# Patient Record
Sex: MALE | Race: WHITE | NOT HISPANIC OR LATINO | Employment: FULL TIME | URBAN - NONMETROPOLITAN AREA
[De-identification: names, ages, dates, MRNs, and addresses within clinical notes are randomized per-mention and may not be internally consistent; named-entity substitution may affect disease eponyms.]

---

## 2020-08-22 ENCOUNTER — HOSPITAL ENCOUNTER (EMERGENCY)
Facility: HOSPITAL | Age: 30
End: 2020-08-23
Attending: EMERGENCY MEDICINE | Admitting: EMERGENCY MEDICINE
Payer: OTHER MISCELLANEOUS

## 2020-08-22 ENCOUNTER — APPOINTMENT (EMERGENCY)
Dept: CT IMAGING | Facility: HOSPITAL | Age: 30
End: 2020-08-22
Payer: OTHER MISCELLANEOUS

## 2020-08-22 DIAGNOSIS — M54.9 BACK PAIN: Primary | ICD-10-CM

## 2020-08-22 LAB
ALBUMIN SERPL BCP-MCNC: 3.8 G/DL (ref 3.5–5)
ALP SERPL-CCNC: 63 U/L (ref 46–116)
ALT SERPL W P-5'-P-CCNC: 39 U/L (ref 12–78)
ANION GAP SERPL CALCULATED.3IONS-SCNC: 9 MMOL/L (ref 4–13)
AST SERPL W P-5'-P-CCNC: 24 U/L (ref 5–45)
BASOPHILS # BLD AUTO: 0.02 THOUSANDS/ΜL (ref 0–0.1)
BASOPHILS NFR BLD AUTO: 0 % (ref 0–1)
BILIRUB SERPL-MCNC: 0.33 MG/DL (ref 0.2–1)
BILIRUB UR QL STRIP: NEGATIVE
BUN SERPL-MCNC: 14 MG/DL (ref 5–25)
CALCIUM SERPL-MCNC: 8.3 MG/DL (ref 8.3–10.1)
CHLORIDE SERPL-SCNC: 107 MMOL/L (ref 100–108)
CLARITY UR: CLEAR
CO2 SERPL-SCNC: 25 MMOL/L (ref 21–32)
COLOR UR: YELLOW
CREAT SERPL-MCNC: 0.98 MG/DL (ref 0.6–1.3)
EOSINOPHIL # BLD AUTO: 0.16 THOUSAND/ΜL (ref 0–0.61)
EOSINOPHIL NFR BLD AUTO: 2 % (ref 0–6)
ERYTHROCYTE [DISTWIDTH] IN BLOOD BY AUTOMATED COUNT: 13.1 % (ref 11.6–15.1)
GFR SERPL CREATININE-BSD FRML MDRD: 102 ML/MIN/1.73SQ M
GLUCOSE SERPL-MCNC: 92 MG/DL (ref 65–140)
GLUCOSE UR STRIP-MCNC: NEGATIVE MG/DL
HCT VFR BLD AUTO: 38.1 % (ref 36.5–49.3)
HGB BLD-MCNC: 12.9 G/DL (ref 12–17)
HGB UR QL STRIP.AUTO: NEGATIVE
IMM GRANULOCYTES # BLD AUTO: 0.01 THOUSAND/UL (ref 0–0.2)
IMM GRANULOCYTES NFR BLD AUTO: 0 % (ref 0–2)
KETONES UR STRIP-MCNC: NEGATIVE MG/DL
LEUKOCYTE ESTERASE UR QL STRIP: NEGATIVE
LYMPHOCYTES # BLD AUTO: 1.87 THOUSANDS/ΜL (ref 0.6–4.47)
LYMPHOCYTES NFR BLD AUTO: 24 % (ref 14–44)
MCH RBC QN AUTO: 27.5 PG (ref 26.8–34.3)
MCHC RBC AUTO-ENTMCNC: 33.9 G/DL (ref 31.4–37.4)
MCV RBC AUTO: 81 FL (ref 82–98)
MONOCYTES # BLD AUTO: 0.46 THOUSAND/ΜL (ref 0.17–1.22)
MONOCYTES NFR BLD AUTO: 6 % (ref 4–12)
NEUTROPHILS # BLD AUTO: 5.27 THOUSANDS/ΜL (ref 1.85–7.62)
NEUTS SEG NFR BLD AUTO: 68 % (ref 43–75)
NITRITE UR QL STRIP: NEGATIVE
NRBC BLD AUTO-RTO: 0 /100 WBCS
PH UR STRIP.AUTO: 5.5 [PH]
PLATELET # BLD AUTO: 183 THOUSANDS/UL (ref 149–390)
PMV BLD AUTO: 12.2 FL (ref 8.9–12.7)
POTASSIUM SERPL-SCNC: 3.7 MMOL/L (ref 3.5–5.3)
PROT SERPL-MCNC: 6.3 G/DL (ref 6.4–8.2)
PROT UR STRIP-MCNC: NEGATIVE MG/DL
RBC # BLD AUTO: 4.69 MILLION/UL (ref 3.88–5.62)
SODIUM SERPL-SCNC: 141 MMOL/L (ref 136–145)
SP GR UR STRIP.AUTO: 1.02 (ref 1–1.03)
UROBILINOGEN UR QL STRIP.AUTO: 0.2 E.U./DL
WBC # BLD AUTO: 7.79 THOUSAND/UL (ref 4.31–10.16)

## 2020-08-22 PROCEDURE — 36415 COLL VENOUS BLD VENIPUNCTURE: CPT | Performed by: EMERGENCY MEDICINE

## 2020-08-22 PROCEDURE — 96374 THER/PROPH/DIAG INJ IV PUSH: CPT

## 2020-08-22 PROCEDURE — 96375 TX/PRO/DX INJ NEW DRUG ADDON: CPT

## 2020-08-22 PROCEDURE — 99285 EMERGENCY DEPT VISIT HI MDM: CPT

## 2020-08-22 PROCEDURE — 85025 COMPLETE CBC W/AUTO DIFF WBC: CPT | Performed by: EMERGENCY MEDICINE

## 2020-08-22 PROCEDURE — 74176 CT ABD & PELVIS W/O CONTRAST: CPT

## 2020-08-22 PROCEDURE — 96372 THER/PROPH/DIAG INJ SC/IM: CPT

## 2020-08-22 PROCEDURE — 99285 EMERGENCY DEPT VISIT HI MDM: CPT | Performed by: EMERGENCY MEDICINE

## 2020-08-22 PROCEDURE — 81003 URINALYSIS AUTO W/O SCOPE: CPT | Performed by: EMERGENCY MEDICINE

## 2020-08-22 PROCEDURE — 96361 HYDRATE IV INFUSION ADD-ON: CPT

## 2020-08-22 PROCEDURE — G1004 CDSM NDSC: HCPCS

## 2020-08-22 PROCEDURE — 80053 COMPREHEN METABOLIC PANEL: CPT | Performed by: EMERGENCY MEDICINE

## 2020-08-22 RX ORDER — MORPHINE SULFATE 4 MG/ML
4 INJECTION, SOLUTION INTRAMUSCULAR; INTRAVENOUS ONCE
Status: COMPLETED | OUTPATIENT
Start: 2020-08-22 | End: 2020-08-22

## 2020-08-22 RX ORDER — LITHIUM CARBONATE 300 MG
300 TABLET ORAL 2 TIMES DAILY
COMMUNITY

## 2020-08-22 RX ORDER — DIAZEPAM 5 MG/1
5 TABLET ORAL ONCE
Status: COMPLETED | OUTPATIENT
Start: 2020-08-22 | End: 2020-08-22

## 2020-08-22 RX ORDER — CYCLOBENZAPRINE HCL 10 MG
10 TABLET ORAL 2 TIMES DAILY PRN
Qty: 20 TABLET | Refills: 0 | Status: ON HOLD | OUTPATIENT
Start: 2020-08-22 | End: 2020-08-24 | Stop reason: SDUPTHER

## 2020-08-22 RX ORDER — KETOROLAC TROMETHAMINE 30 MG/ML
30 INJECTION, SOLUTION INTRAMUSCULAR; INTRAVENOUS ONCE
Status: COMPLETED | OUTPATIENT
Start: 2020-08-22 | End: 2020-08-22

## 2020-08-22 RX ORDER — ESCITALOPRAM OXALATE 10 MG/1
10 TABLET ORAL DAILY
COMMUNITY

## 2020-08-22 RX ORDER — NAPROXEN 500 MG/1
500 TABLET ORAL 2 TIMES DAILY WITH MEALS
Qty: 30 TABLET | Refills: 0 | Status: ON HOLD | OUTPATIENT
Start: 2020-08-22 | End: 2020-08-24 | Stop reason: CLARIF

## 2020-08-22 RX ORDER — FENTANYL CITRATE 50 UG/ML
50 INJECTION, SOLUTION INTRAMUSCULAR; INTRAVENOUS ONCE
Status: COMPLETED | OUTPATIENT
Start: 2020-08-22 | End: 2020-08-22

## 2020-08-22 RX ORDER — OLANZAPINE 10 MG/1
10 TABLET, ORALLY DISINTEGRATING ORAL ONCE
Status: COMPLETED | OUTPATIENT
Start: 2020-08-22 | End: 2020-08-22

## 2020-08-22 RX ADMIN — SODIUM CHLORIDE 1000 ML: 0.9 INJECTION, SOLUTION INTRAVENOUS at 22:14

## 2020-08-22 RX ADMIN — OLANZAPINE 10 MG: 10 TABLET, ORALLY DISINTEGRATING ORAL at 22:39

## 2020-08-22 RX ADMIN — FENTANYL CITRATE 50 MCG: 50 INJECTION INTRAMUSCULAR; INTRAVENOUS at 23:33

## 2020-08-22 RX ADMIN — MORPHINE SULFATE 4 MG: 4 INJECTION INTRAVENOUS at 22:15

## 2020-08-22 RX ADMIN — KETOROLAC TROMETHAMINE 30 MG: 30 INJECTION, SOLUTION INTRAMUSCULAR at 20:16

## 2020-08-22 RX ADMIN — DIAZEPAM 5 MG: 5 TABLET ORAL at 20:18

## 2020-08-22 NOTE — Clinical Note
Cesar Crooks was seen and treated in our emergency department on 8/22/2020  Diagnosis:     Emily Vuong  may return to work on return date  He may return on this date: 08/25/2020         If you have any questions or concerns, please don't hesitate to call        Wanda Walker, DO    ______________________________           _______________          _______________  Hospital Representative                              Date                                Time

## 2020-08-23 ENCOUNTER — HOSPITAL ENCOUNTER (INPATIENT)
Facility: HOSPITAL | Age: 30
LOS: 1 days | Discharge: HOME | DRG: 243 | End: 2020-08-24
Attending: INTERNAL MEDICINE | Admitting: INTERNAL MEDICINE
Payer: OTHER MISCELLANEOUS

## 2020-08-23 VITALS
DIASTOLIC BLOOD PRESSURE: 60 MMHG | TEMPERATURE: 99 F | OXYGEN SATURATION: 96 % | HEART RATE: 79 BPM | WEIGHT: 230.82 LBS | HEIGHT: 69 IN | RESPIRATION RATE: 16 BRPM | BODY MASS INDEX: 34.19 KG/M2 | SYSTOLIC BLOOD PRESSURE: 110 MMHG

## 2020-08-23 DIAGNOSIS — M54.42 ACUTE RIGHT-SIDED LOW BACK PAIN WITH LEFT-SIDED SCIATICA: Primary | ICD-10-CM

## 2020-08-23 DIAGNOSIS — M54.9 BACK PAIN: ICD-10-CM

## 2020-08-23 PROBLEM — R26.2 AMBULATORY DYSFUNCTION: Status: ACTIVE | Noted: 2020-08-23

## 2020-08-23 PROBLEM — E66.9 OBESITY, CLASS I, BMI 30-34.9: Status: ACTIVE | Noted: 2019-03-19

## 2020-08-23 PROBLEM — E66.811 OBESITY, CLASS I, BMI 30-34.9: Status: ACTIVE | Noted: 2019-03-19

## 2020-08-23 PROBLEM — E66.09 CLASS 2 OBESITY DUE TO EXCESS CALORIES WITHOUT SERIOUS COMORBIDITY WITH BODY MASS INDEX (BMI) OF 35.0 TO 35.9 IN ADULT: Status: ACTIVE | Noted: 2019-03-19

## 2020-08-23 PROBLEM — E34.9 TESTOSTERONE DEFICIENCY: Status: ACTIVE | Noted: 2020-08-23

## 2020-08-23 PROBLEM — E66.812 CLASS 2 OBESITY DUE TO EXCESS CALORIES WITHOUT SERIOUS COMORBIDITY WITH BODY MASS INDEX (BMI) OF 35.0 TO 35.9 IN ADULT: Status: ACTIVE | Noted: 2019-03-19

## 2020-08-23 LAB
ERYTHROCYTE [SEDIMENTATION RATE] IN BLOOD: 7 MM/HOUR (ref 0–14)
LITHIUM SERPL-SCNC: 0.2 MMOL/L (ref 0.5–1)

## 2020-08-23 PROCEDURE — 80178 ASSAY OF LITHIUM: CPT | Performed by: EMERGENCY MEDICINE

## 2020-08-23 PROCEDURE — 96375 TX/PRO/DX INJ NEW DRUG ADDON: CPT

## 2020-08-23 PROCEDURE — 96374 THER/PROPH/DIAG INJ IV PUSH: CPT

## 2020-08-23 PROCEDURE — 99223 1ST HOSP IP/OBS HIGH 75: CPT | Performed by: INTERNAL MEDICINE

## 2020-08-23 PROCEDURE — 85652 RBC SED RATE AUTOMATED: CPT | Performed by: EMERGENCY MEDICINE

## 2020-08-23 PROCEDURE — 36415 COLL VENOUS BLD VENIPUNCTURE: CPT | Performed by: EMERGENCY MEDICINE

## 2020-08-23 RX ORDER — DEXAMETHASONE SODIUM PHOSPHATE 10 MG/ML
10 INJECTION, SOLUTION INTRAMUSCULAR; INTRAVENOUS ONCE
Status: DISCONTINUED | OUTPATIENT
Start: 2020-08-23 | End: 2020-08-23

## 2020-08-23 RX ORDER — KETAMINE HCL IN NACL, ISO-OSM 100MG/10ML
0.5 SYRINGE (ML) INJECTION ONCE
Status: COMPLETED | OUTPATIENT
Start: 2020-08-23 | End: 2020-08-23

## 2020-08-23 RX ORDER — HYDROMORPHONE HCL/PF 1 MG/ML
1 SYRINGE (ML) INJECTION ONCE
Status: COMPLETED | OUTPATIENT
Start: 2020-08-23 | End: 2020-08-23

## 2020-08-23 RX ORDER — LITHIUM CARBONATE 300 MG/1
300 CAPSULE ORAL 2 TIMES DAILY WITH MEALS
Status: DISCONTINUED | OUTPATIENT
Start: 2020-08-23 | End: 2020-08-24 | Stop reason: HOSPADM

## 2020-08-23 RX ORDER — ESCITALOPRAM OXALATE 10 MG/1
10 TABLET ORAL DAILY
Status: DISCONTINUED | OUTPATIENT
Start: 2020-08-23 | End: 2020-08-24 | Stop reason: HOSPADM

## 2020-08-23 RX ORDER — ACETAMINOPHEN 325 MG/1
650 TABLET ORAL EVERY 6 HOURS PRN
Status: DISCONTINUED | OUTPATIENT
Start: 2020-08-23 | End: 2020-08-24 | Stop reason: HOSPADM

## 2020-08-23 RX ORDER — MIDAZOLAM HYDROCHLORIDE 2 MG/2ML
2 INJECTION, SOLUTION INTRAMUSCULAR; INTRAVENOUS ONCE
Status: DISCONTINUED | OUTPATIENT
Start: 2020-08-23 | End: 2020-08-23 | Stop reason: HOSPADM

## 2020-08-23 RX ORDER — CYCLOBENZAPRINE HCL 10 MG
10 TABLET ORAL 3 TIMES DAILY PRN
Status: DISCONTINUED | OUTPATIENT
Start: 2020-08-23 | End: 2020-08-24 | Stop reason: HOSPADM

## 2020-08-23 RX ADMIN — LITHIUM CARBONATE 300 MG: 300 CAPSULE, GELATIN COATED ORAL at 21:50

## 2020-08-23 RX ADMIN — DEXAMETHASONE SODIUM PHOSPHATE 10 MG: 10 INJECTION, SOLUTION INTRAMUSCULAR; INTRAVENOUS at 01:03

## 2020-08-23 RX ADMIN — ESCITALOPRAM OXALATE 10 MG: 10 TABLET ORAL at 12:31

## 2020-08-23 RX ADMIN — Medication 53 MG: at 04:10

## 2020-08-23 RX ADMIN — CYCLOBENZAPRINE HYDROCHLORIDE 10 MG: 10 TABLET, FILM COATED ORAL at 17:03

## 2020-08-23 RX ADMIN — HYDROMORPHONE HYDROCHLORIDE 1 MG: 1 INJECTION, SOLUTION INTRAMUSCULAR; INTRAVENOUS; SUBCUTANEOUS at 02:03

## 2020-08-23 RX ADMIN — LITHIUM CARBONATE 300 MG: 300 CAPSULE, GELATIN COATED ORAL at 17:03

## 2020-08-23 NOTE — DISCHARGE INSTRUCTIONS
Make sure to follow up with your family doctor tomorrow  If symptoms persist or worsen, return to ER! You may need an MRI, or additional imaging  Your family doctor will help you receive this

## 2020-08-23 NOTE — H&P
H&P- Karlie Albright 1990, 26 y o  male MRN: [id number]    Unit/Bed#: [facility] 801-01 Encounter: [id number]    Primary Care Provider: Kristi Nieto MD   Date and time admitted to hospital: 8/23/2020 10:57 AM        Ambulatory dysfunction  Assessment & Plan  Secondary to acute back pain  Strength intact, but ambulation limited secondary to severe pain  · PT/OT  · See plan for back pain    Anxiety and depression  Assessment & Plan  Lithium level noted to be low on admission  · Continue lithium and Lexapro     History of cerebral aneurysm repair  Assessment & Plan  · Historically documented  No clinical sign of aneurysm rupture  · Continue to monitor blood pressures    * Acute right-sided low back pain with left-sided sciatica  Assessment & Plan  Patient reports acute onset of right-sided back pain, radiating down the left anterior thigh, occurring while working yesterday  Works for [employer], says he frequently has to lift heavy loads, yesterday he says this happened while he was bending over and stood up  He denies recreational drug use including injections  On straight leg raise test, patient did report pain radiating down right anterior thigh to knee  · Patient without clinical sign of infection  Doubt very much that this represents abscess  Differential more likely muscle strain versus herniated disc  · Will obtain MRI of lumbar spine  · Hold off on neurosurgery consult for now, pending imaging results  · Continue conservative management  · PT/OT  · Neuro checks overnight  · Tylenol p r n  · Flexeril p r n    · Aqua K    History and Physical - [facility]    Patient Information: Karlie Albright 26 y o  male MRN: [id number]  Unit/Bed#: [facility] 801-01 Encounter: [id number]  Admitting Physician: Hanh Wesley DO  PCP: Kristi Nieto MD  Date of Admission:  08/23/20      VTE Prophylaxis: Activity as tolerated  / reason for no mechanical VTE prophylaxis Low risk DVT   Code Status: Level 1 - Full Code   POLST: POLST form is not discussed and not completed at this time  Anticipated Length of Stay:  Patient will be admitted on an Inpatient basis with an anticipated length of stay of > 2 midnights  Justification for Hospital Stay: Please see detailed plans noted above  Total Time for Visit, including Counseling / Coordination of Care: 45 minutes  Greater than 50% of this total time spent on direct patient counseling and coordination of care  Chief Complaint:    Back pain    History of Present Illness:    Alvin Hernandez is a 26 y o  male who presents with acute onset of low back pain yesterday  Patient works for [employer], and reports that he was in his usual state of health yesterday when he noticed some mild back soreness for couple hours yesterday  He says he occasionally gets back pain which he thinks is due to his job  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  Currently, patient is awake, alert, no distress  He still reports back pain with attempting to get up, ambulate, or move his right lower extremity  He denies numbness, overt muscle weakness, bowel/bladder incontinence, saddle anesthesia  No headache, visual changes  No chest pain or shortness of breath  No history of stroke, although he does have a history of aneurysm repair  Has history of anxiety and depression for which he takes Lexapro    He denies recreational drug use and does not use injectable drugs  He only smokes cigars occasionally  He says otherwise his mood has been good, and he denies recent life stressors  He just ended a relationship, but says this was more of a good thing as he states it was a bad relationship  Review of Systems:    Review of Systems   Constitutional: Negative for activity change, chills and fever  HENT: Negative  Negative for hearing loss, sore throat and trouble swallowing  Eyes: Negative for visual disturbance  Respiratory: Negative for cough, shortness of breath and wheezing  Cardiovascular: Negative for chest pain, palpitations and leg swelling  Gastrointestinal: Negative for abdominal distention, abdominal pain, blood in stool, constipation, diarrhea, nausea and vomiting  Endocrine: Negative  Genitourinary: Negative for dysuria, frequency and hematuria  Musculoskeletal: Positive for back pain  Negative for arthralgias, joint swelling and myalgias  Skin: Negative  Allergic/Immunologic: Negative for immunocompromised state  Neurological: Negative for dizziness, facial asymmetry, speech difficulty, weakness, numbness and headaches  Hematological: Negative  Psychiatric/Behavioral: Negative for behavioral problems, confusion and dysphoric mood  The patient is not nervous/anxious  Past Medical and Surgical History:     Past Medical History:   Diagnosis Date    Depression     Intracranial subdural hematoma ([address] ) 3/25/2013       Past Surgical History:   Procedure Laterality Date    CRANIECTOMY      NOSE SURGERY         Meds/Allergies:    Prior to Admission medications    Medication Sig Start Date End Date Taking?  Authorizing Provider   cyclobenzaprine (FLEXERIL) 10 mg tablet Take 1 tablet (10 mg total) by mouth 2 (two) times a day as needed for muscle spasms  Patient not taking: Reported on 8/23/2020 8/22/20   Lexi Fairbanks DO   escitalopram (LEXAPRO) 10 mg tablet Take 10 mg by mouth daily Historical Provider, MD   lithium 300 MG tablet Take 300 mg by mouth 2 (two) times a day    Historical Provider, MD   naproxen (NAPROSYN) 500 mg tablet Take 1 tablet (500 mg total) by mouth 2 (two) times a day with meals  Patient not taking: Reported on 8/23/2020 8/22/20   Ky Jaramillo DO     I have reviewed home medications with patient personally  Allergies: No Known Allergies    Social History:     Marital Status: Single   Occupation:  Works for [employer]  Patient Pre-hospital Living Situation: Home with sons  Patient Pre-hospital Level of Mobility: Ambulatory  Patient Pre-hospital Diet Restrictions: n/a  Substance Use History:   Social History     Substance and Sexual Activity   Alcohol Use Yes    Frequency: Monthly or less     Social History     Tobacco Use   Smoking Status Current Some Day Smoker    Types: Cigars   Smokeless Tobacco Never Used     Social History     Substance and Sexual Activity   Drug Use Not Currently       Family History:    History reviewed  No pertinent family history  Physical Exam:     Vitals:   Blood Pressure: 125/80 (08/23/20 1100)  Pulse: 72 (08/23/20 1100)  Temperature: 98 °F (36 7 °C) (08/23/20 1100)  Temp Source: Oral (08/23/20 1100)  Respirations: 20 (08/23/20 1059)  Height: 5' 8" (172 7 cm) (08/23/20 1100)  Weight - Scale: 107 kg (235 lb) (08/23/20 1100)  SpO2: 98 % (08/23/20 1100)    Physical Exam  Vitals signs reviewed  Constitutional:       General: He is not in acute distress  Appearance: Normal appearance  He is well-developed  He is not diaphoretic  HENT:      Head: Normocephalic and atraumatic  Nose: Nose normal    Eyes:      Extraocular Movements: Extraocular movements intact  Conjunctiva/sclera: Conjunctivae normal       Pupils: Pupils are equal, round, and reactive to light  Neck:      Musculoskeletal: Normal range of motion and neck supple  Thyroid: No thyromegaly  Vascular: No JVD  Trachea: No tracheal deviation  Cardiovascular:      Rate and Rhythm: Normal rate and regular rhythm  Pulses: Normal pulses  Heart sounds: Normal heart sounds  Pulmonary:      Effort: Pulmonary effort is normal  No respiratory distress  Breath sounds: Normal breath sounds  Abdominal:      General: Abdomen is flat  There is no distension  Palpations: Abdomen is soft  Tenderness: There is no abdominal tenderness  There is no guarding  Musculoskeletal: Normal range of motion  General: No swelling or deformity  Lumbar back: He exhibits tenderness  Right lower leg: No edema  Left lower leg: No edema  Comments: Right paraspinal musculature noted to be tense on lumbar spine  Mild discomfort on palpation  Straight leg raise test resulted in low back pain radiating down anterior thigh to right knee   Lymphadenopathy:      Cervical: No cervical adenopathy  Upper Body:      Right upper body: No supraclavicular adenopathy  Left upper body: No supraclavicular adenopathy  Skin:     General: Skin is warm and dry  Findings: No rash  Neurological:      General: No focal deficit present  Mental Status: He is alert and oriented to person, place, and time  Cranial Nerves: No cranial nerve deficit  Sensory: No sensory deficit  Motor: No tremor, abnormal muscle tone or seizure activity  Comments: Right lower extremity strength limited secondary to poor patient effort from pain, but otherwise strength appears intact  Brudzinski sign negative   Psychiatric:         Mood and Affect: Mood normal          Behavior: Behavior normal            Additional Data:     Lab Results: I have personally reviewed pertinent reports       Results from last 7 days   Lab Units 08/22/20  2215   WBC Thousand/uL 7 79   HEMOGLOBIN g/dL 12 9   HEMATOCRIT % 38 1   PLATELETS Thousands/uL 183   NEUTROS PCT % 68   LYMPHS PCT % 24   MONOS PCT % 6   EOS PCT % 2     Results from last 7 days   Lab Units 08/22/20  2215   POTASSIUM mmol/L 3 7   CHLORIDE mmol/L 107   CO2 mmol/L 25   BUN mg/dL 14   CREATININE mg/dL 0 98   CALCIUM mg/dL 8 3   ALK PHOS U/L 63   ALT U/L 39   AST U/L 24           Imaging: I have personally reviewed pertinent reports  and I have personally reviewed pertinent films in PACS    Ct Renal Stone Study Abdomen Pelvis Wo Contrast    Result Date: 8/22/2020  Narrative: CT ABDOMEN AND PELVIS WITHOUT IV CONTRAST - LOW DOSE RENAL STONE INDICATION:   Flank pain, kidney stone suspected  COMPARISON:  None  TECHNIQUE:  Low dose thin section CT examination of the abdomen and pelvis was performed without intravenous or oral contrast according to a protocol specifically designed to evaluate for urinary tract calculus  Axial, sagittal, and coronal 2D reformatted images were created from the source data and submitted for interpretation  Evaluation for pathology in the abdomen and pelvis that is unrelated to urinary tract calculi is limited  Radiation dose length product (DLP) for this visit:  631 mGy-cm   This examination, like all CT scans performed in the [facility], was performed utilizing techniques to minimize radiation dose exposure, including the use of iterative reconstruction and automated exposure control  FINDINGS: RIGHT KIDNEY AND URETER: No urinary tract calculi  No hydronephrosis or hydroureter  LEFT KIDNEY AND URETER: No urinary tract calculi  No hydronephrosis or hydroureter  URINARY BLADDER: Unremarkable  No significant abnormality in the visualized lung bases  Limited low radiation dose noncontrast CT evaluation demonstrates no clinically significant abnormality of liver, spleen, pancreas, or adrenal glands  No calcified gallstones or gallbladder wall thickening noted  No ascites or bulky lymphadenopathy on this limited noncontrast study  Bowel loops appear unremarkable  Limited evaluation demonstrates no evidence to suggest acute appendicitis   No acute fracture or destructive osseous lesion is identified  Impression: No evidence of stone or hydronephrosis Workstation performed: [id number]       EKG, Pathology, and Other Studies Reviewed on Admission:   · EKG: Not obtained this encounter    [facility] Records Reviewed: Yes    Portions of the record may have been created with voice recognition software  Occasional wrong word or "sound a like" substitutions may have occurred due to the inherent limitations of voice recognition software  Read the chart carefully and recognize, using context, where substitutions have occurred

## 2020-08-23 NOTE — PLAN OF CARE
Problem: Potential for Falls  Goal: Patient will remain free of falls  Description: INTERVENTIONS:  - Assess patient frequently for physical needs  -  Identify cognitive and physical deficits and behaviors that affect risk of falls    -  [place] fall precautions as indicated by assessment   - Educate patient/family on patient safety including physical limitations  - Instruct patient to call for assistance with activity based on assessment  - Modify environment to reduce risk of injury  - Consider OT/PT consult to assist with strengthening/mobility  Outcome: Progressing     Problem: PAIN - ADULT  Goal: Verbalizes/displays adequate comfort level or baseline comfort level  Description: Interventions:  - Encourage patient to monitor pain and request assistance  - Assess pain using appropriate pain scale  - Administer analgesics based on type and severity of pain and evaluate response  - Implement non-pharmacological measures as appropriate and evaluate response  - Consider cultural and social influences on pain and pain management  - Notify physician/advanced practitioner if interventions unsuccessful or patient reports new pain  Outcome: Progressing     Problem: INFECTION - ADULT  Goal: Absence or prevention of progression during hospitalization  Description: INTERVENTIONS:  - Assess and monitor for signs and symptoms of infection  - Monitor lab/diagnostic results  - Monitor all insertion sites, i e  indwelling lines, tubes, and drains  - Monitor endotracheal if appropriate and nasal secretions for changes in amount and color  - [place] appropriate cooling/warming therapies per order  - Administer medications as ordered  - Instruct and encourage patient and family to use good hand hygiene technique  - Identify and instruct in appropriate isolation precautions for identified infection/condition  Outcome: Progressing  Goal: Absence of fever/infection during neutropenic period  Description: INTERVENTIONS:  - Monitor WBC    Outcome: Progressing     Problem: SAFETY ADULT  Goal: Patient will remain free of falls  Description: INTERVENTIONS:  - Assess patient frequently for physical needs  -  Identify cognitive and physical deficits and behaviors that affect risk of falls  -  [place] fall precautions as indicated by assessment   - Educate patient/family on patient safety including physical limitations  - Instruct patient to call for assistance with activity based on assessment  - Modify environment to reduce risk of injury  - Consider OT/PT consult to assist with strengthening/mobility  Outcome: Progressing  Goal: Maintain or return to baseline ADL function  Description: INTERVENTIONS:  - Assess patient frequently for physical needs  -  Identify cognitive and physical deficits and behaviors that affect risk of falls    -  [place] fall precautions as indicated by assessment   - Educate patient/family on patient safety including physical limitations  - Instruct patient to call for assistance with activity based on assessment  - Modify environment to reduce risk of injury  - Consider OT/PT consult to assist with strengthening/mobility  Outcome: Progressing  Goal: Maintain or return mobility status to optimal level  Description: INTERVENTIONS:  -  Assess patient's ability to carry out ADLs; assess patient's baseline for ADL function and identify physical deficits which impact ability to perform ADLs (bathing, care of mouth/teeth, toileting, grooming, dressing, etc )  - Assess/evaluate cause of self-care deficits   - Assess range of motion  - Assess patient's mobility; develop plan if impaired  - Assess patient's need for assistive devices and provide as appropriate  - Encourage maximum independence but intervene and supervise when necessary  - Involve family in performance of ADLs  - Assess for home care needs following discharge   - Consider OT consult to assist with ADL evaluation and planning for discharge  - Provide patient education as appropriate  Outcome: Progressing     Problem: DISCHARGE PLANNING  Goal: Discharge to home or other facility with appropriate resources  Description: INTERVENTIONS:  - Identify barriers to discharge w/patient and caregiver  - Arrange for needed discharge resources and transportation as appropriate  - Identify discharge learning needs (meds, wound care, etc )  - Arrange for interpretive services to assist at discharge as needed  - Refer to Case Management Department for coordinating discharge planning if the patient needs post-hospital services based on physician/advanced practitioner order or complex needs related to functional status, cognitive ability, or social support system  Outcome: Progressing     Problem: Knowledge Deficit  Goal: Patient/family/caregiver demonstrates understanding of disease process, treatment plan, medications, and discharge instructions  Description: Complete learning assessment and assess knowledge base    Interventions:  - Provide teaching at level of understanding  - Provide teaching via preferred learning methods  Outcome: Progressing

## 2020-08-23 NOTE — ASSESSMENT & PLAN NOTE
Secondary to acute back pain    Strength intact, but ambulation limited secondary to severe pain  · PT/OT  · See plan for back pain

## 2020-08-23 NOTE — ED PROVIDER NOTES
History  Chief Complaint   Patient presents with    Back Pain     Patient reports back pain that started approximately 1 hour PTA  No specific injury  Reports pain to midline lower back  States that he was at work and got a sudden onset of pain  Denies numbness, tingling, or loss of bowel or bladder function  Reports sharp shooting pain in his back when bending his left leg  57-year-old male presents to ED with lower back pain x1 day  Patient denies fever, IV drug abuse, bowel incontinence, urinary incontinence, saddle anesthesia, history of malignancy in family  Back Pain   Location:  Lumbar spine  Quality:  Aching and shooting  Radiates to:  R posterior upper leg  Pain severity:  Moderate  Pain is: Worse during the day  Onset quality:  Sudden  Timing:  Intermittent  Progression:  Unchanged  Chronicity:  New  Context: lifting heavy objects and twisting    Relieved by:  Bed rest  Worsened by:  Twisting and movement  Ineffective treatments:  None tried  Associated symptoms: no abdominal pain, no bladder incontinence, no bowel incontinence, no fever, no numbness, no paresthesias, no perianal numbness, no tingling, no weakness and no weight loss        Prior to Admission Medications   Prescriptions Last Dose Informant Patient Reported? Taking?   escitalopram (LEXAPRO) 10 mg tablet   Yes Yes   Sig: Take 10 mg by mouth daily   lithium 300 MG tablet   Yes Yes   Sig: Take 300 mg by mouth 2 (two) times a day      Facility-Administered Medications: None       Past Medical History:   Diagnosis Date    Depression     Intracranial subdural hematoma ([facility] [address] ) 3/25/2013       Past Surgical History:   Procedure Laterality Date    CRANIECTOMY      NOSE SURGERY         History reviewed  No pertinent family history  I have reviewed and agree with the history as documented      E-Cigarette/Vaping    E-Cigarette Use Never User      E-Cigarette/Vaping Substances     Social History     Tobacco Use    Smoking status: Current Some Day Smoker     Types: Cigars    Smokeless tobacco: Never Used   Substance Use Topics    Alcohol use: Yes     Frequency: Monthly or less    Drug use: Not Currently       Review of Systems   Constitutional: Negative for fever and weight loss  Gastrointestinal: Negative for abdominal pain and bowel incontinence  Genitourinary: Negative for bladder incontinence  Musculoskeletal: Positive for back pain  Neurological: Negative for tingling, weakness, numbness and paresthesias  All other systems reviewed and are negative  Physical Exam  Physical Exam  Vitals signs and nursing note reviewed  Exam conducted with a chaperone present  General Appearance:    Alert, cooperative, mild distress, appears stated age   Head:    Normocephalic, without obvious abnormality, atraumatic   Eyes:    PERRL, conjunctiva/corneas clear, EOM's intact, fundi     benign, both eyes   Ears:    Normal TM's and external ear canals, both ears   Nose:   Nares normal, septum midline, mucosa normal, no drainage     or sinus tenderness   Throat:   Lips, mucosa, and tongue normal; teeth and gums normal   Neck:   Supple, symmetrical, trachea midline, no adenopathy;     thyroid:  no enlargement/tenderness/nodules; no carotid    bruit or JVD   Back:     Symmetric, ROM normal, no CVA tenderness, L2-L3 tenderness noted    Patient also has right paraspinal tenderness L2/L3 Positive right-sided straight leg test    Lungs:     Clear to auscultation bilaterally, respirations unlabored   Chest Wall:    No tenderness or deformity   Abdomen:     Soft, non-tender, bowel sounds active all four quadrants,     no masses, no organomegaly   Extremities:   Extremities normal, atraumatic, no cyanosis or edema   Pulses:   2+ and symmetric all extremities   Skin:   Skin color, texture, turgor normal, no rashes or lesions   Neurologic:   CNII-XII intact, normal strength, sensation and reflexes     throughout         Vital Signs  ED Triage Vitals   Temperature Pulse Respirations Blood Pressure SpO2   08/22/20 2004 08/22/20 2004 08/22/20 2004 08/22/20 2004 08/22/20 2004   99 °F (37 2 °C) 84 18 144/89 99 %      Temp Source Heart Rate Source Patient Position - Orthostatic VS BP Location FiO2 (%)   08/22/20 2004 08/22/20 2004 08/22/20 2004 08/22/20 2004 --   Temporal Monitor Lying Left arm       Pain Score       08/22/20 2215       7           Vitals:    08/23/20 0830 08/23/20 0845 08/23/20 0915 08/23/20 0945   BP: 108/60 102/59 104/59 110/60   Pulse: 62 62 61 79   Patient Position - Orthostatic VS:             Visual Acuity  Visual Acuity      Most Recent Value   L Pupil Size (mm)  2   R Pupil Size (mm)  2          ED Medications  Medications   ketorolac (TORADOL) injection 30 mg (30 mg Intramuscular Given 8/22/20 2016)   diazepam (VALIUM) tablet 5 mg (5 mg Oral Given 8/22/20 2018)   sodium chloride 0 9 % bolus 1,000 mL (0 mL Intravenous Stopped 8/22/20 2340)   morphine (PF) 4 mg/mL injection 4 mg (4 mg Intravenous Given 8/22/20 2215)   OLANZapine (ZyPREXA ZYDIS) dispersible tablet 10 mg (10 mg Oral Given 8/22/20 2239)   fentanyl citrate (PF) 100 MCG/2ML 50 mcg (50 mcg Intravenous Given 8/22/20 2333)   dexamethasone 10 mg/mL oral liquid 10 mg 1 mL (10 mg Oral Given 8/23/20 0103)   HYDROmorphone (DILAUDID) injection 1 mg (1 mg Intravenous Given 8/23/20 0203)   Ketamine HCl 53 mg (53 mg Intravenous Given by Other 8/23/20 0410)       Diagnostic Studies  Results Reviewed     Procedure Component Value Units Date/Time    Sedimentation rate, automated [[id number]]  (Normal) Collected:  08/23/20 0515    Lab Status:  Final result Specimen:  Blood from Arm, Left Updated:  08/23/20 0533     Sed Rate 7 mm/hour     Narrative:       New method- Test performed using  automated Rheology Technology  If following a patient's inflammatory disease during treatment, a new baseline should be established      Lithium level [[id number]]  (Abnormal) Collected:  08/23/20 0202 Lab Status:  Final result Specimen:  Blood from Arm, Left Updated:  08/23/20 0326     Lithium Lvl 0 2 mmol/L     Comprehensive metabolic panel [[id number]]  (Abnormal) Collected:  08/22/20 2215    Lab Status:  Final result Specimen:  Blood from Arm, Left Updated:  08/22/20 2244     Sodium 141 mmol/L      Potassium 3 7 mmol/L      Chloride 107 mmol/L      CO2 25 mmol/L      ANION GAP 9 mmol/L      BUN 14 mg/dL      Creatinine 0 98 mg/dL      Glucose 92 mg/dL      Calcium 8 3 mg/dL      AST 24 U/L      ALT 39 U/L      Alkaline Phosphatase 63 U/L      Total Protein 6 3 g/dL      Albumin 3 8 g/dL      Total Bilirubin 0 33 mg/dL      eGFR 102 ml/min/1 73sq m     Narrative:       [place] guidelines for Chronic Kidney Disease (CKD):     Stage 1 with normal or high GFR (GFR > 90 mL/min/1 73 square meters)    Stage 2 Mild CKD (GFR = 60-89 mL/min/1 73 square meters)    Stage 3A Moderate CKD (GFR = 45-59 mL/min/1 73 square meters)    Stage 3B Moderate CKD (GFR = 30-44 mL/min/1 73 square meters)    Stage 4 Severe CKD (GFR = 15-29 mL/min/1 73 square meters)    Stage 5 End Stage CKD (GFR <15 mL/min/1 73 square meters)  Note: GFR calculation is accurate only with a steady state creatinine    CBC and differential [[id number]]  (Abnormal) Collected:  08/22/20 2215    Lab Status:  Final result Specimen:  Blood from Arm, Left Updated:  08/22/20 2227     WBC 7 79 Thousand/uL      RBC 4 69 Million/uL      Hemoglobin 12 9 g/dL      Hematocrit 38 1 %      MCV 81 fL      MCH 27 5 pg      MCHC 33 9 g/dL      RDW 13 1 %      MPV 12 2 fL      Platelets 558 Thousands/uL      nRBC 0 /100 WBCs      Neutrophils Relative 68 %      Immat GRANS % 0 %      Lymphocytes Relative 24 %      Monocytes Relative 6 %      Eosinophils Relative 2 %      Basophils Relative 0 %      Neutrophils Absolute 5 27 Thousands/µL      Immature Grans Absolute 0 01 Thousand/uL      Lymphocytes Absolute 1 87 Thousands/µL      Monocytes Absolute 0 46 Thousand/µL      Eosinophils Absolute 0 16 Thousand/µL      Basophils Absolute 0 02 Thousands/µL     UA (URINE) with reflex to Scope [[id number]] Collected:  08/22/20 2216    Lab Status:  Final result Specimen:  Urine, Clean Catch Updated:  08/22/20 2227     Color, UA Yellow     Clarity, UA Clear     Specific Gravity, UA 1 025     pH, UA 5 5     Leukocytes, UA Negative     Nitrite, UA Negative     Protein, UA Negative mg/dl      Glucose, UA Negative mg/dl      Ketones, UA Negative mg/dl      Urobilinogen, UA 0 2 E U /dl      Bilirubin, UA Negative     Blood, UA Negative                 CT renal stone study abdomen pelvis wo contrast   Final Result by Mandy Rodriguez MD (08/22 2302)      No evidence of stone or hydronephrosis             Workstation performed: [id number]                    Procedures  Procedures         ED Course  ED Course as of Aug 24 0632   Sat Aug 22, 2020   2116 Patient continue have pain, will draw labs and check CT to rule out renal stone  2311 Patient's tenderness has decreased, will ambulatory challenge  Discussed outpatient MRI  [place] Aug 23, 2020   0009 Case discussed with Dr Rachid Edwards from Radiology, L1, L2 with degenerative disc disease  Conversation via tiger text       2156 Patient continues experience pain, will attempt sub dissociative ketamine adjunct pain control  0418 Sedimentation rate, automated   0509 Patient continued to have back pain, no relief, and is having difficulty lifting his extremities  At this point concern for patient's presentation, will transfer for MRI       Brielle Lewis to accept pt at [facility]  US AUDIT      Most Recent Value   Initial Alcohol Screen: US AUDIT-C    1  How often do you have a drink containing alcohol? 1 Filed at: 08/23/2020 0045   2  How many drinks containing alcohol do you have on a typical day you are drinking? 2 Filed at: 08/23/2020 0045   3a  Male UNDER 65:  How often do you have five or more drinks on one occasion? 0 Filed at: 08/23/2020 0045   3b  FEMALE Any Age, or MALE 65+: How often do you have 4 or more drinks on one occassion? 0 Filed at: 08/23/2020 0045   Audit-C Score  3 Filed at: 08/23/2020 0045                  JOSE CARLOS/DAST-10      Most Recent Value   How many times in the past year have you    Used an illegal drug or used a prescription medication for non-medical reasons?   Never Filed at: 08/22/2020 2008                                [facility]  Number of Diagnoses or Management Options  Risk of Complications, Morbidity, and/or Mortality  Presenting problems: low  Diagnostic procedures: minimal  Management options: minimal          Disposition  Final diagnoses:   Back pain     Time reflects when diagnosis was documented in both MDM as applicable and the Disposition within this note     Time User Action Codes Description Comment    8/22/2020 11:14 PM Marisela Kleber Add [M54 9] Back pain       ED Disposition     ED Disposition Condition Date/Time Comment    Transfer to Another Facility-In [facility] Aug 23, 2020  5:15 AM Jourdanstephen Baca should be transferred out to [facility] MANN Documentation      Most Recent Value   Patient Condition  The patient has been stabilized such that within reasonable medical probability, no material deterioration of the patient condition or the condition of the unborn child(miguel ángel) is likely to result from the transfer   Reason for Transfer  Level of Care needed not available at this facility   Benefits of Transfer  Specialized equipment and/or services available at the receiving facility (Include comment)________________________   Risks of Transfer  Potential for delay in receiving treatment, Potential deterioration of medical condition, Loss of IV, Increased discomfort during transfer, Possible worsening of condition or death during transfer   Accepting Physician  Dr Bowie Patches Name, [address] [facility]   Transfer Coordinator (Name & Tel number)  Darlene Given   Sending MARIYA Kerr  Provider Certification  General risk, such as traffic hazards, adverse weather conditions, rough terrain or turbulence, possible failure of equipment (including vehicle or aircraft), or consequences of actions of persons outside the control of the transport personnel, Unanticipated needs of medical equipment and personnel during transport, Risk of worsening condition, The possibility of a transport vehicle being unavailable      RN Documentation      Most [address] Name, [address]   Bed Assignment  P8 Room 801    (Name & Tel number)  Darlene Given      Follow-up Information     Follow up With Specialties Details Why Valeriy Krause MD Family Medicine Call in 1 day  Critical access hospital  [phone]            Discharge Medication List as of 8/22/2020 11:20 PM      START taking these medications    Details   cyclobenzaprine (FLEXERIL) 10 mg tablet Take 1 tablet (10 mg total) by mouth 2 (two) times a day as needed for muscle spasms, Starting Sat 8/22/2020, Print      naproxen (NAPROSYN) 500 mg tablet Take 1 tablet (500 mg total) by mouth 2 (two) times a day with meals, Starting Sat 8/22/2020, Print         CONTINUE these medications which have NOT CHANGED    Details   escitalopram (LEXAPRO) 10 mg tablet Take 10 mg by mouth daily, Historical Med      lithium 300 MG tablet Take 300 mg by mouth 2 (two) times a day, Historical Med           No discharge procedures on file      PDMP Review     None          ED Provider  Electronically Signed by           January Sinha DO  08/24/20 4397

## 2020-08-23 NOTE — LETTER
[address] [facility] [address]  [address]  [address] [facility] [address]  Dept: [phone]    August 24, 2020     Patient: Adam Gray   YOB: 1990   Date of Visit: 8/23/2020       To Whom it May Concern:    Radha Bias is under my professional care  He was seen in the hospital from 8/23/2020   to 08/24/20  If you have any questions or concerns, please don't hesitate to call           Sincerely,          Suzy Chatterjee, [address] [facility]  [phone]

## 2020-08-23 NOTE — ED NOTES
Report called to Josie Bar RN at [facility]  All questions answered        Gabino Ovalles RN  08/23/20 4907

## 2020-08-23 NOTE — PLAN OF CARE
Problem: Potential for Falls  Goal: Patient will remain free of falls  Description: INTERVENTIONS:  - Assess patient frequently for physical needs  -  Identify cognitive and physical deficits and behaviors that affect risk of falls    -  [place] fall precautions as indicated by assessment   - Educate patient/family on patient safety including physical limitations  - Instruct patient to call for assistance with activity based on assessment  - Modify environment to reduce risk of injury  - Consider OT/PT consult to assist with strengthening/mobility  Outcome: Progressing     Problem: PAIN - ADULT  Goal: Verbalizes/displays adequate comfort level or baseline comfort level  Description: Interventions:  - Encourage patient to monitor pain and request assistance  - Assess pain using appropriate pain scale  - Administer analgesics based on type and severity of pain and evaluate response  - Implement non-pharmacological measures as appropriate and evaluate response  - Consider cultural and social influences on pain and pain management  - Notify physician/advanced practitioner if interventions unsuccessful or patient reports new pain  Outcome: Progressing     Problem: INFECTION - ADULT  Goal: Absence or prevention of progression during hospitalization  Description: INTERVENTIONS:  - Assess and monitor for signs and symptoms of infection  - Monitor lab/diagnostic results  - Monitor all insertion sites, i e  indwelling lines, tubes, and drains  - Monitor endotracheal if appropriate and nasal secretions for changes in amount and color  - [place] appropriate cooling/warming therapies per order  - Administer medications as ordered  - Instruct and encourage patient and family to use good hand hygiene technique  - Identify and instruct in appropriate isolation precautions for identified infection/condition  Outcome: Progressing  Goal: Absence of fever/infection during neutropenic period  Description: INTERVENTIONS:  - Monitor WBC    Outcome: Progressing     Problem: SAFETY ADULT  Goal: Patient will remain free of falls  Description: INTERVENTIONS:  - Assess patient frequently for physical needs  -  Identify cognitive and physical deficits and behaviors that affect risk of falls    -  [place] fall precautions as indicated by assessment   - Educate patient/family on patient safety including physical limitations  - Instruct patient to call for assistance with activity based on assessment  - Modify environment to reduce risk of injury  - Consider OT/PT consult to assist with strengthening/mobility  Outcome: Progressing  Goal: Maintain or return to baseline ADL function  Description: INTERVENTIONS:  -  Assess patient's ability to carry out ADLs; assess patient's baseline for ADL function and identify physical deficits which impact ability to perform ADLs (bathing, care of mouth/teeth, toileting, grooming, dressing, etc )  - Assess/evaluate cause of self-care deficits   - Assess range of motion  - Assess patient's mobility; develop plan if impaired  - Assess patient's need for assistive devices and provide as appropriate  - Encourage maximum independence but intervene and supervise when necessary  - Involve family in performance of ADLs  - Assess for home care needs following discharge   - Consider OT consult to assist with ADL evaluation and planning for discharge  - Provide patient education as appropriate  Outcome: Progressing  Goal: Maintain or return mobility status to optimal level  Description: INTERVENTIONS:  - Assess patient's baseline mobility status (ambulation, transfers, stairs, etc )    - Identify cognitive and physical deficits and behaviors that affect mobility  - Identify mobility aids required to assist with transfers and/or ambulation (gait belt, sit-to-stand, lift, walker, cane, etc )  - [place] fall precautions as indicated by assessment  - Record patient progress and toleration of activity level on Mobility SBAR; progress patient to next Phase/Stage  - Instruct patient to call for assistance with activity based on assessment  - Consider rehabilitation consult to assist with strengthening/weightbearing, etc   Outcome: Progressing     Problem: DISCHARGE PLANNING  Goal: Discharge to home or other facility with appropriate resources  Description: INTERVENTIONS:  - Identify barriers to discharge w/patient and caregiver  - Arrange for needed discharge resources and transportation as appropriate  - Identify discharge learning needs (meds, wound care, etc )  - Arrange for interpretive services to assist at discharge as needed  - Refer to Case Management Department for coordinating discharge planning if the patient needs post-hospital services based on physician/advanced practitioner order or complex needs related to functional status, cognitive ability, or social support system  Outcome: Progressing     Problem: Knowledge Deficit  Goal: Patient/family/caregiver demonstrates understanding of disease process, treatment plan, medications, and discharge instructions  Description: Complete learning assessment and assess knowledge base    Interventions:  - Provide teaching at level of understanding  - Provide teaching via preferred learning methods  Outcome: Progressing

## 2020-08-23 NOTE — EMTALA/ACUTE CARE TRANSFER
[facility]  [address]  [facility] [address]  Dept: [phone]      EMTALA TRANSFER CONSENT    NAME Chano Zhou                                         1990                              MRN [id number]    I have been informed of my rights regarding examination, treatment, and transfer   by Dr Constantino Ceballos DO    Benefits: Specialized equipment and/or services available at the receiving facility (Include comment)________________________    Risks: Potential for delay in receiving treatment, Potential deterioration of medical condition, Loss of IV, Increased discomfort during transfer, Possible worsening of condition or death during transfer      Consent for Transfer:  I acknowledge that my medical condition has been evaluated and explained to me by the emergency department physician or other qualified medical person and/or my attending physician, who has recommended that I be transferred to the service of  Accepting Physician: Dr Charli Clark at [facility] Name, [address] : [facility]  The above potential benefits of such transfer, the potential risks associated with such transfer, and the probable risks of not being transferred have been explained to me, and I fully understand them  The doctor has explained that, in my case, the benefits of transfer outweigh the risks  I agree to be transferred  I authorize the performance of emergency medical procedures and treatments upon me in both transit and upon arrival at the receiving facility  Additionally, I authorize the release of any and all medical records to the receiving facility and request they be transported with me, if possible  I understand that the safest mode of transportation during a medical emergency is an ambulance and that the Hospital advocates the use of this mode of transport   Risks of traveling to the receiving facility by car, including absence of medical control, life sustaining equipment, such as oxygen, and medical personnel has been explained to me and I fully understand them  ( CORRECT BOX BELOW)  [  ]  I consent to the stated transfer and to be transported by ambulance/helicopter  [  ]  I consent to the stated transfer, but refuse transportation by ambulance and accept full responsibility for my transportation by car  I understand the risks of non-ambulance transfers and I exonerate the Hospital and its staff from any deterioration in my condition that results from this refusal     X___________________________________________    DATE  20  TIME________  Signature of patient or legally responsible individual signing on patient behalf           RELATIONSHIP TO PATIENT_________________________          Provider Certification    NAME Fabi Angelica                                         1990                              MRN [id number]    A medical screening exam was performed on the above named patient  Based on the examination:    Condition Necessitating Transfer The encounter diagnosis was Back pain      Patient Condition: The patient has been stabilized such that within reasonable medical probability, no material deterioration of the patient condition or the condition of the unborn child(miguel ángel) is likely to result from the transfer    Reason for Transfer: Level of Care needed not available at this facility    Transfer Requirements: [address]   · Space available and qualified personnel available for treatment as acknowledged by Hetal Choi  · Agreed to accept transfer and to provide appropriate medical treatment as acknowledged by       Dr Que Lewis  · Appropriate medical records of the examination and treatment of the patient are provided at the time of transfer   [address] _______  · Transfer will be performed by qualified personnel from    and appropriate transfer equipment as required, including the use of necessary and appropriate life support measures  Provider Certification: I have examined the patient and explained the following risks and benefits of being transferred/refusing transfer to the patient/family:  General risk, such as traffic hazards, adverse weather conditions, rough terrain or turbulence, possible failure of equipment (including vehicle or aircraft), or consequences of actions of persons outside the control of the transport personnel, Unanticipated needs of medical equipment and personnel during transport, Risk of worsening condition, The possibility of a transport vehicle being unavailable      Based on these reasonable risks and benefits to the patient and/or the unborn child(miguel ángel), and based upon the information available at the time of the patients examination, I certify that the medical benefits reasonably to be expected from the provision of appropriate medical treatments at another medical facility outweigh the increasing risks, if any, to the individuals medical condition, and in the case of labor to the unborn child, from effecting the transfer      X____________________________________________ DATE 08/23/20        TIME_______      ORIGINAL - SEND TO MEDICAL RECORDS   COPY - SEND WITH PATIENT DURING TRANSFER

## 2020-08-23 NOTE — ED NOTES
Patient able toy move over on his own to the litter to be transferred to [facility] for the MRI  Report attempted to call to [place] and nurse was administering medication at the time and will call her back in a few minutes  Patient left the ED at [facility] by [address] [place] [address]  Patient AAOX4 and in no immediate distress         Melissa Triana RN  08/23/20 6994

## 2020-08-23 NOTE — ED NOTES
This RN removed the patient's IV and had him ambulate to ensure he could safely walk out of the ER  The patient then fell to the bed and was unable to walk  Dr Stephanie Coyle made aware        Taurus Guzman RN  08/23/20 0001

## 2020-08-23 NOTE — ASSESSMENT & PLAN NOTE
Patient reports acute onset of right-sided back pain, radiating down the left anterior thigh, occurring while working yesterday  Works for [employer], says he frequently has to lift heavy loads, yesterday he says this happened while he was bending over and stood up  He denies recreational drug use including injections  On straight leg raise test, patient did report pain radiating down right anterior thigh to knee  · Patient without clinical sign of infection  Doubt very much that this represents abscess  Differential more likely muscle strain versus herniated disc  · Will obtain MRI of lumbar spine  · Hold off on neurosurgery consult for now, pending imaging results  · Continue conservative management  · PT/OT  · Neuro checks overnight  · Tylenol p r n  · Flexeril p r n    · Aqua K

## 2020-08-23 NOTE — ED NOTES
[facility]  Report: [id number] Harry Barrios RN  08/23/20 5245

## 2020-08-23 NOTE — ASSESSMENT & PLAN NOTE
· Historically documented  No clinical sign of aneurysm rupture    · Continue to monitor blood pressures

## 2020-08-23 NOTE — ED NOTES
Pt is still unable to walk and/or bear weight on right leg  Dr Bubba Todd aware       Shawna Nuñez, RN  08/23/20 7987

## 2020-08-24 ENCOUNTER — APPOINTMENT (INPATIENT)
Dept: RADIOLOGY | Facility: HOSPITAL | Age: 30
DRG: 243 | End: 2020-08-24
Payer: OTHER MISCELLANEOUS

## 2020-08-24 VITALS
TEMPERATURE: 97.9 F | BODY MASS INDEX: 35.61 KG/M2 | SYSTOLIC BLOOD PRESSURE: 122 MMHG | HEART RATE: 74 BPM | DIASTOLIC BLOOD PRESSURE: 68 MMHG | RESPIRATION RATE: 18 BRPM | WEIGHT: 235 LBS | HEIGHT: 68 IN | OXYGEN SATURATION: 99 %

## 2020-08-24 PROCEDURE — 99239 HOSP IP/OBS DSCHRG MGMT >30: CPT | Performed by: NURSE PRACTITIONER

## 2020-08-24 PROCEDURE — 97163 PT EVAL HIGH COMPLEX 45 MIN: CPT

## 2020-08-24 PROCEDURE — 72148 MRI LUMBAR SPINE W/O DYE: CPT

## 2020-08-24 PROCEDURE — G1004 CDSM NDSC: HCPCS

## 2020-08-24 PROCEDURE — 97166 OT EVAL MOD COMPLEX 45 MIN: CPT

## 2020-08-24 RX ORDER — CYCLOBENZAPRINE HCL 10 MG
10 TABLET ORAL 3 TIMES DAILY PRN
Qty: 30 TABLET | Refills: 0 | Status: SHIPPED | OUTPATIENT
Start: 2020-08-24

## 2020-08-24 RX ORDER — IBUPROFEN 200 MG
600 TABLET ORAL EVERY 8 HOURS PRN
Refills: 0
Start: 2020-08-24

## 2020-08-24 RX ADMIN — LITHIUM CARBONATE 300 MG: 300 CAPSULE, GELATIN COATED ORAL at 08:30

## 2020-08-24 RX ADMIN — ESCITALOPRAM OXALATE 10 MG: 10 TABLET ORAL at 08:30

## 2020-08-24 NOTE — TREATMENT PLAN
Received a call from [address] [place] on 8/24/20 at 8 45 am about Mr Yuan Santamaria who is a 17-year-old male who presented with acute low back pain after lifting heavy object at work  Per report, pt had difficulty ambulating due to severe back pain and difficulty lifting the RLE  No numbnesss/tingling was reported  No bowel or bladder incontinence was reported  Pt had an MRI of the lumbar spine wo 08/24/2020 which revealed mild disc herniations at L4-L5 and L5-S1 with mild right foraminal narrowing  Given the above findings, no neurosurgical intervention is anticipated at this time  Recommend patient trial conservative management with multimodal pain regimen which may include muscle relaxer/steroids  Patient may also consider physical therapy as needed  Recommend the patient try to avoid heavy lifting over the next few weeks  If symptoms worsen or persist despite conservative management for about 6 weeks, patient could follow-up with Neurosurgery office as needed for further evaluation and discussion

## 2020-08-24 NOTE — PHYSICAL THERAPY NOTE
Physical Therapy Evaluation    Patient's Name: Viviane Griffin    Admitting Diagnosis  Back pain [M54 9]    Problem List  Patient Active Problem List   Diagnosis    Acute right-sided low back pain with left-sided sciatica    History of cerebral aneurysm repair    Anxiety and depression    Class 2 obesity due to excess calories without serious comorbidity with body mass index (BMI) of 35 0 to 35 9 in adult    Testosterone deficiency    Anxiety    Ambulatory dysfunction       Past Medical History  Past Medical History:   Diagnosis Date    Depression     Intracranial subdural hematoma ([address] ) 3/25/2013       Past Surgical History  Past Surgical History:   Procedure Laterality Date    CRANIECTOMY      NOSE SURGERY          08/24/20 0934   Note Type   Note type Eval only   Pain Assessment   Pain Assessment Tool 0-10   Pain Score 5   Pain Location/Orientation Orientation: Bilateral;Location: Back   Hospital Pain Intervention(s) Repositioned; Ambulation/increased activity   Home Living   Type of [address] Two level;Bed/bath upstairs   Prior Function   Level of [place] Independent with ADLs and functional mobility   Lives With Alone   ADL Assistance Independent   IADLs Independent   Vocational Full time employment  ([employer])   Comments Pt reports changing a car battery while at work and sudden onset of LBP radiating to R LE    Has significantly improved since admission    General   Family/Caregiver Present No   Cognition   Overall Cognitive Status WFL   Arousal/Participation Alert   Orientation Level Oriented X4   Memory Within functional limits   Following Commands Follows all commands and directions without difficulty   RLE Assessment   RLE Assessment WNL  (5/5, hip flexion 4/5)   LLE Assessment   LLE Assessment WNL  (5/5)   Light Touch   RLE Light Touch Grossly intact   LLE Light Touch Grossly intact   Bed Mobility   Supine to Sit 7  Independent   Transfers   Sit to Stand 7  Independent   Stand to Sit 7  Independent   Additional Comments no AD   Ambulation/Elevation   Gait pattern Improper Weight shift; Foward flexed;Decreased R stance; Antalgic   Gait Assistance 5  Supervision   Assistive Device None   Distance 120 ft x2, inital trial, pt wtih decreased R stance phase, antalgic gait pattern, reports improved with distance, independent 2nd trial, improved mechanics  Stair Management Assistance 5  Supervision   Additional items Assist x 1;Verbal cues  (VC's for step to pattern for safety, sequencing)   Stair Management Technique One rail R;Nonreciprocal   Number of Stairs 7   Balance   Static Sitting Normal   Dynamic Sitting Good   Static Standing Fair +   Dynamic Standing Fair +   Ambulatory Fair   Activity Tolerance   Activity Tolerance Patient limited by pain   Medical Staff Made Aware OT, Alia   Nurse Made Aware RN updated   Assessment   Assessment Pt is a 26 y o  male seen for PT evaluation s/p admit to [facility] on 8/23/2020  Pt was admitted with a primary dx of: acute R sided low back pain  MRI L/S: mild disc herniation at levels of L4-5 and L5-S1 without significant canal or foraminal stenosis  PT now consulted for assessment of mobility and d/c needs  Pt with Up with assistance orders  Pts current comorbidities effecting treatment include: Subdural hematoma s/p craniectomy 2013  Personal factors effecting treatment include: Stairs to enter home, stairs to second floor, full-time employment in active job requiring lifting/bending  Pts current clinical presentation is Unstable/ Unpredictable (high complexity) due to Decreased activity tolerance compared to baseline, Fall risk, Spinal precautions at current time, pain with mobility  Prior to admission, pt was independent with all mobility  Upon evaluation, pt currently is independent with bed mobility; independenty with transfers, requires supervision for ambulation 120 ft and supervision for stair climb 7 steps with 1 HR    Pt educated on spinal precautions, safety with ambulation and stair climbing, importance of positional changes at home to prevent back stiffness, pt verbalized understanding, no further questions/concerns regarding potential discharge later today  At conclusion of PT session pt returned BTB with phone and call bell within reach  Pt denies any further questions at this time  Recommend home with OPPT upon hospital D/C     Goals   Patient Goals "go home today"   Plan   PT Frequency One time visit   Recommendation   PT Discharge Recommendation Home with skilled therapy  (OPPT)   PT - OK to Discharge Yes   Barthel Index   Feeding 10   Bathing 5   Grooming Score 5   Dressing Score 10   Bladder Score 10   Bowels Score 10   Toilet Use Score 10   Transfers (Bed/Chair) Score 15   Mobility (Level Surface) Score 10   Stairs Score 5   Barthel Index Score 90       Carmen La, PT, DPT

## 2020-08-24 NOTE — UTILIZATION REVIEW
Initial Clinical Review    Admission: Date/Time/Statement:   Admission Orders (From admission, onward)     Ordered        08/23/20 1126  Inpatient Admission  Once        TRANSFER FROM ED [facility] TO [facility]  LEVEL OF CARE            Orders Placed This Encounter   Procedures    Inpatient Admission     Standing Status:   Standing     Number of Occurrences:   1     Order Specific Question:   Admitting Physician     Answer:   Regino Powell [[id number]]     Order Specific Question:   Level of Care     Answer:   Med Surg [16]     Order Specific Question:   Estimated length of stay     Answer:   More than 2 Midnights     Order Specific Question:   Certification     Answer:   I certify that inpatient services are medically necessary for this patient for a duration of greater than two midnights  See H&P and MD Progress Notes for additional information about the patient's course of treatment  Assessment/Plan:   Mr Schuyler Jurado is a 27 yo male who presents as a transfer [facility] the ED at [facility]  [place] to [facility] for treatment of  Acute onset of LBP x 1 day  He noticed some mild back soreness x several hours on the day PTA which he thinks is r/t his job at [employer] doing heavy lifting  He then experienced an acute onset of severe R LBP while bending over and then straightening up  Pain was so severe it made ambulation difficult  He needed an MRI to r/o paraspinal abscess but their MRI was unavailable so he was transferred to [facility]  Pain is in back and into RLE with no other difficulties or symptoms  PMH: depression, cerebral aneurysm repair  On straight leg raise test, patient did report pain radiating down right anterior thigh to knee  He is admitted to INPATIENT status with Acute R LBP with R sciatica - MRI L spine, neuro checks overnight, PRN tylenol and Flexeril, Aqua K pad  Ambulatory dysfunction - PT/OT evals           Pain Score       08/23/20 1105       No Pain          Wt Readings from Last 1 Encounters: 08/23/20 107 kg (235 lb)     Additional Vital Signs:   08/24/20 00:11:39   97 9 °F (36 6 °C)   74   18   122/68   --   99 %    08/23/20 15:25:26   97 9 °F (36 6 °C)   71   18   133/80   --   99 %    08/23/20 11:00:36   98 °F (36 7 °C)   72   --   125/80   95   98 %    08/23/20 10:59:49   98 °F (36 7 °C)   --   20   125/80   95   --      Pertinent Labs/Diagnostic Test Results:     8/24 MRI L spine - Mild disc herniation at levels L4-5 and L5-S1 without significant canal or foraminal stenosis as described  8/22 CT renal stone studay, abd, pelvis - Unremarkable     No significant abnormality in the visualized lung bases  Limited low radiation dose noncontrast CT evaluation demonstrates no clinically significant abnormality of liver, spleen, pancreas, or adrenal glands  No calcified gallstones or gallbladder wall thickening noted  No ascites or bulky lymphadenopathy on this limited noncontrast study  Bowel loops appear unremarkable  Limited evaluation demonstrates no evidence to suggest acute appendicitis  No acute fracture or destructive osseous lesion is identified      Results from last 7 days   Lab Units 08/22/20  2215   WBC Thousand/uL 7 79   HEMOGLOBIN g/dL 12 9   HEMATOCRIT % 38 1   PLATELETS Thousands/uL 183   NEUTROS ABS Thousands/µL 5 27         Results from last 7 days   Lab Units 08/22/20  2215   SODIUM mmol/L 141   POTASSIUM mmol/L 3 7   CHLORIDE mmol/L 107   CO2 mmol/L 25   ANION GAP mmol/L 9   BUN mg/dL 14   CREATININE mg/dL 0 98   EGFR ml/min/1 73sq m 102   CALCIUM mg/dL 8 3     Results from last 7 days   Lab Units 08/22/20  2215   AST U/L 24   ALT U/L 39   ALK PHOS U/L 63   TOTAL PROTEIN g/dL 6 3*   ALBUMIN g/dL 3 8   TOTAL BILIRUBIN mg/dL 0 33         Results from last 7 days   Lab Units 08/22/20  2215   GLUCOSE RANDOM mg/dL 92     Results from last 7 days   Lab Units 08/23/20  0515   SED RATE mm/hour 7         Results from last 7 days   Lab Units 08/22/20  2216   CLARITY UA Clear   COLOR UA  Yellow   SPEC GRAV UA  1 025   PH UA  5 5   GLUCOSE UA mg/dl Negative   KETONES UA mg/dl Negative   BLOOD UA  Negative   PROTEIN UA mg/dl Negative   NITRITE UA  Negative   BILIRUBIN UA  Negative   UROBILINOGEN UA E U /dl 0 2   LEUKOCYTES UA  Negative       Past Medical History:   Diagnosis Date    Depression     Intracranial subdural hematoma ([facility] [address] ) 3/25/2013     Present on Admission:   Acute right-sided low back pain with left-sided sciatica   Anxiety and depression   Ambulatory dysfunction    Admitting Diagnosis: Back pain [M54 9]     Age/Sex: 26 y o  male     Admission Orders:  Scheduled Medications:  escitalopram, 10 mg, Oral, Daily  lithium carbonate, 300 mg, Oral, BID With Meals      Continuous IV Infusions:     PRN Meds:  acetaminophen, 650 mg, Oral, Q6H PRN  cyclobenzaprine, 10 mg, Oral, TID PRN - x 1 8/23    SCDs  Regular diet  Neuro checks q 4 hr   Activity as ronnell , up w/ assist       Network Utilization Review Department  [email or web address]  ATTENTION: Please call with any questions or concerns to [phone] and carefully listen to the prompts so that you are directed to the right person  All voicemails are confidential   Morris Starks all requests for admission clinical reviews, approved or denied determinations and any other requests to dedicated fax number below belonging to the campus where the patient is receiving treatment   List of dedicated fax numbers for the Facilities:  FACILITY NAME UR FAX NUMBER   ADMISSION DENIALS (Administrative/Medical Necessity) [phone]   [address]  (Maternity/NICU/Pediatrics) [phone]   [place] [facility] [phone]   [facility] [phone]   [facility] [phone]   [facility] [facility] [address] [address] [place] [phone]   [place] [id number] [phone]

## 2020-08-24 NOTE — OCCUPATIONAL THERAPY NOTE
Occupational Therapy Evaluation     Patient Name: Tony Ball  EJVWI'N Date: 8/24/2020  Problem List  Principal Problem:    Acute right-sided low back pain with left-sided sciatica  Active Problems:    History of cerebral aneurysm repair    Anxiety and depression    Ambulatory dysfunction    Past Medical History  Past Medical History:   Diagnosis Date    Depression     Intracranial subdural hematoma ([address] ) 3/25/2013     Past Surgical History  Past Surgical History:   Procedure Laterality Date    CRANIECTOMY      NOSE SURGERY               08/24/20 8343   Note Type   Note type Eval only   Restrictions/Precautions   Weight Bearing Precautions Per Order No   Other Precautions Pain;Telemetry   Pain Assessment   Pain Assessment Tool 0-10   Pain Score 2   Pain Location/Orientation Orientation: Bilateral;Location: Back   Hospital Pain Intervention(s) Repositioned; Ambulation/increased activity; Emotional support; Rest   Home Living   Type of [address] [place] [address] Two level;Bed/bath upstairs   Bathroom Shower/Tub Tub/shower unit   Bathroom Toilet Standard   Bathroom Equipment   (pt denies DME at baseline)   Bathroom Accessibility Accessible   Prior Function   Level of Page Independent with ADLs and functional mobility   Lives With Alone   ADL Assistance Independent   IADLs Independent   Falls in the last 6 months 0   Vocational Full time employment   Lifestyle   Autonomy I ADL's/IADL's, no AD with functional ambulation, +drives   Reciprocal Relationships supportive mother can assist as needed   Service to Others full time employement at [employer] ()   [address] staying active   Psychosocial   Psychosocial (WDL) WDL   ADL   Eating Assistance 7  Independent   Grooming Assistance 7  Independent   UB Bathing Assistance 7  Independent   LB Bathing Assistance 6  Modified Independent   LB Bathing Deficit Increased time to complete   [address] 7  Independent   LB Dressing Assistance 6  Modified independent   LB Dressing Deficit Increased time to complete; Don/doff R sock; Don/doff L sock  (+crossed over leg method post skilled education to avoid back protection techniques)   Toileting Assistance  7  Independent   Bed Mobility   Supine to Sit 7  Independent   Transfers   Sit to Stand 7  Independent   Stand to Sit 7  Independent   Toilet transfer 7  Independent   Functional Mobility   Functional Mobility 6  Modified independent   Additional Comments mod I without AD household distance funtional mobility, no LOB or unsteadiness noted   Additional items   (no AD)   Balance   Static Sitting Normal   Dynamic Sitting Good   Static Standing Fair +   Dynamic Standing Fair +   Ambulatory Fair   Activity Tolerance   Activity Tolerance Patient tolerated treatment well   Medical Staff Made Aware PT   Nurse Made Aware RN cleared pt for therapy   RUE Assessment   RUE Assessment WFL   LUE Assessment   LUE Assessment WFL   Hand Function   Gross Motor Coordination Functional   Fine Motor Coordination Functional   Vision-Basic Assessment   Current Vision No visual deficits   Cognition   Overall Cognitive Status WFL   Arousal/Participation Alert; Cooperative   Attention Within functional limits   Orientation Level Oriented X4   Memory Within functional limits   Following Commands Follows all commands and directions without difficulty   Comments pt demonstrated WFL cognition during evaluation, with good insight into deficits, good carryover and recall of new learning (post education on back protection techniques with ADL's), and problem solving skills  Assessment   Limitation Decreased high-level ADLs   Prognosis Good   Assessment Pt is a 26 y o  male who was admitted to [facility] on [facility]/23/2020 with Acute right-sided low back pain with left-sided sciatica, mild herniated disc L4-L5, L5-S1,  history of cerebral aneurysm repair s/p craniotomy, anxiety/derpession, ambulatory dysfunction    Pt's problem list also includes PMH of class 2 obesity, testorsterone deficiency, anxiety  At baseline pt was completing I ADL's/IADL's, no AD with functional ambulation, +drives, works full time  Pt lives alone in a 2  with a first floor set-up  Currently pt requires Mod I for overall ADLS and mod I without AD for functional mobility/transfers  Pt currently presents with impairments in the following categories -steps to enter environment and limited home support activity tolerance  These impairments, as well as pt's pain  limit pt's ability to safely engage in all baseline areas of occupation, includingdriving and work/volunteer work  From [employer] standpoint, recommend home with family support upon D/C  No further acute OT needs indicated at this time - Recommend continued oob for meals, ambulation to/from BR, setup for self care tasks and mobility in hallway with nursing/restorative - d/c from caseload with above recommendations   Goals   Patient Goals go home   Recommendation   OT Discharge Recommendation Return to previous environment with social support   OT - OK to Discharge Yes   Barthel Index   Feeding 10   Bathing 5   Grooming Score 5   Dressing Score 10   Bladder Score 10   Bowels Score 10   Toilet Use Score 10   Transfers (Bed/Chair) Score 15   Mobility (Level Surface) Score 10   Stairs Score 5   Barthel Index Score 90   Modified Pacific Scale   Modified Veena Scale 2     Asha Merritt MOT, OTR/L

## 2020-08-24 NOTE — DISCHARGE SUMMARY
Discharge Summary - [address] Internal Medicine    Patient Information: Karlie Albright 26 y o  male MRN: [id number]  Unit/Bed#: [facility] 801-01 Encounter: [id number]    Discharging Physician / Practitioner: DANGELO Manzo  PCP: Kristi Nieto MD  Admission Date: 8/23/2020  Discharge Date: 08/24/20    Reason for Admission: acute right-sided low back pain    Discharge Diagnoses:     Principal Problem:    Acute right-sided low back pain with left-sided sciatica  Active Problems:    History of cerebral aneurysm repair    Anxiety and depression    Ambulatory dysfunction  Resolved Problems:    * No resolved hospital problems  *      Consultations During Hospital Stay:  · PT    Procedures Performed:     · None    Significant Findings / Test Results:     · CT renal stone study a/p without contrast: negative  · MRI lumbar spine: Mild disc herniation at levels L4-5 and L5-S1 without significant canal or foraminal stenosis as described  · CMP/CBC unremarkable  · Urinalysis negative  · Sed rate 7    Incidental Findings:   · None    Test Results Pending at Discharge (will require follow up): · None     Outpatient Tests Requested:  · Outpatient PT  · Outpatient follow-up with PCP  · Outpatient follow-up with Neurosurgery if fails conservative management    Complications:  None    Hospital Course:     Karlie Albright is a 26 y o  male patient with past medical history of anxiety and depression who originally presented to the hospital on 8/23/2020 due to acute right-sided low back pain  Patient works at [employer] and reports he has been in his usual state of health until day prior to admission he noticed that he was having some lower back pain  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  He presented to the [address] [place] [address] ER with difficulty ambulating and back pain and was transferred here for MRI of lumbar spine given some concern for paraspinal abscess    MRI was negative abscess but did show mild disc herniation at levels L4-5 and L5-S1 without significant canal or foraminal stenosis  Recommend conservative management with muscle relaxants, p r n  Tylenol as well as outpatient physical therapy  We discussed limiting strenuous activity/heavy lifting at work  Patient will be out of work for the next 2 days, I offered to give him additional time off however patient refused  Patient is medically stable for discharge  Pain has improved  He is neurologically intact  Will give patient information for neurosurgery office and he can follow-up should the pain worsen or not improve  Otherwise, can follow-up with PCP  Condition at Discharge: stable     Discharge Day Visit / Exam:     Subjective:  Patient offers no acute complaints  Low back pain is improving  He is no longer having difficulty ambulating  No paresthesias, tingling or numbness  No bowel or bladder incontinence  Vitals: Blood Pressure: 122/68 (08/24/20 0011)  Pulse: 74 (08/24/20 0011)  Temperature: 97 9 °F (36 6 °C) (08/24/20 0011)  Temp Source: Oral (08/23/20 1100)  Respirations: 18 (08/24/20 0011)  Height: 5' 8" (172 7 cm) (08/23/20 1100)  Weight - Scale: 107 kg (235 lb) (08/23/20 1100)  SpO2: 99 % (08/24/20 0011)     Exam:   Physical Exam  Vitals signs and nursing note reviewed  Constitutional:       Appearance: He is obese  HENT:      Head: Normocephalic  Eyes:      Conjunctiva/sclera: Conjunctivae normal    Cardiovascular:      Rate and Rhythm: Normal rate  Heart sounds: Normal heart sounds  No murmur  Pulmonary:      Breath sounds: Normal breath sounds  Abdominal:      General: Bowel sounds are normal    Musculoskeletal: Normal range of motion  General: Tenderness: mild, right lower back  Skin:     General: Skin is warm  Neurological:      Mental Status: He is alert and oriented to person, place, and time  Mental status is at baseline     Psychiatric:         Mood and Affect: Mood normal  Discussion with Family:  Patient    Discharge instructions/Information to patient and family:   See after visit summary for information provided to patient and family  Provisions for Follow-Up Care:  See after visit summary for information related to follow-up care and any pertinent home health orders  Disposition:     Home    For Discharges to [facility] SNF:   · Not Applicable to this Patient - Not Applicable to this Patient    Planned Readmission: no     Discharge Statement:  I spent 45 minutes discharging the patient  This time was spent on the day of discharge  I had direct contact with the patient on the day of discharge  Greater than 50% of the total time was spent examining patient, answering all patient questions, arranging and discussing plan of care with patient as well as directly providing post-discharge instructions  Additional time then spent on discharge activities  Discharge Medications:  See after visit summary for reconciled discharge medications provided to patient and family        ** Please Note: This note has been constructed using a voice recognition system **

## 2020-08-25 NOTE — UTILIZATION REVIEW
Notification of Discharge  This is a Notification of Discharge from our facility [address]  Please be advised that this patient has been discharge from our facility  Below you will find the admission and discharge date and time including the patients disposition  PRESENTATION DATE: 8/23/2020 10:57 AM  OBS ADMISSION DATE:   IP ADMISSION DATE: 8/23/20 1126   DISCHARGE DATE: 8/24/2020 10:53 AM  DISPOSITION: Home/Self Care Home/Self Care   Admission Orders listed below:  Admission Orders (From admission, onward)     Ordered        08/23/20 1126  Inpatient Admission  Once                   Please contact the UR Department if additional information is required to close this patient's authorization/case  [address] Utilization Review Department  Main: [phone] x carefully listen to the prompts  All voicemails are confidential   [email or web address]  Send all requests for admission clinical reviews, approved or denied determinations and any other requests to dedicated fax number below belonging to the campus where the patient is receiving treatment   List of dedicated fax numbers:  [address] DENIALS (Administrative/Medical Necessity) [phone]   [address] (Maternity/NICU/Pediatrics) [phone]   Diego Crabtree [phone]   Cherylene Freud [phone]   Wil Oleary [phone]   [address] [phone]   [facility]  [phone]   [address] [facility]  [address] [facility] [address] [facility] [phone]

## 2021-03-10 DIAGNOSIS — Z23 ENCOUNTER FOR IMMUNIZATION: ICD-10-CM

## 2024-06-09 ENCOUNTER — HOSPITAL ENCOUNTER (EMERGENCY)
Facility: HOSPITAL | Age: 34
Discharge: HOME | End: 2024-06-09
Attending: EMERGENCY MEDICINE
Payer: COMMERCIAL

## 2024-06-09 VITALS
BODY MASS INDEX: 40.33 KG/M2 | RESPIRATION RATE: 18 BRPM | WEIGHT: 266.1 LBS | SYSTOLIC BLOOD PRESSURE: 171 MMHG | HEIGHT: 68 IN | OXYGEN SATURATION: 98 % | HEART RATE: 69 BPM | DIASTOLIC BLOOD PRESSURE: 103 MMHG | TEMPERATURE: 97.8 F

## 2024-06-09 DIAGNOSIS — M70.42 PREPATELLAR BURSITIS OF LEFT KNEE: Primary | ICD-10-CM

## 2024-06-09 PROCEDURE — 96372 THER/PROPH/DIAG INJ SC/IM: CPT

## 2024-06-09 PROCEDURE — 99282 EMERGENCY DEPT VISIT SF MDM: CPT

## 2024-06-09 PROCEDURE — 99284 EMERGENCY DEPT VISIT MOD MDM: CPT | Performed by: EMERGENCY MEDICINE

## 2024-06-09 RX ORDER — KETOROLAC TROMETHAMINE 30 MG/ML
30 INJECTION, SOLUTION INTRAMUSCULAR; INTRAVENOUS ONCE
Status: COMPLETED | OUTPATIENT
Start: 2024-06-09 | End: 2024-06-09

## 2024-06-09 RX ADMIN — KETOROLAC TROMETHAMINE 30 MG: 30 INJECTION, SOLUTION INTRAMUSCULAR at 21:15

## 2024-06-10 NOTE — ED PROVIDER NOTES
History  Chief Complaint   Patient presents with    Knee Pain     Patient reports pain and swelling in left knee. Hx of bursitis in bilateral knees 2 years ago.      Patient with history of chronic and recurrent bilateral knee pain, works a lot kneeling down, states over the past day and a half he has had increased pain and redness and swelling in his left knee.  He has previously had episodes of bursitis in this area.  He denies any systemic symptoms such as fever or chills.  He does complain of pain and swelling and redness in the left knee.      History provided by:  Patient   used: No    Knee Pain  Location:  Knee  Time since incident: 1.5 days.  Knee location:  L knee  Pain details:     Quality:  Aching    Radiates to:  Does not radiate    Severity:  Moderate    Onset quality:  Gradual    Duration: 1.5 days.    Timing:  Constant    Progression:  Unchanged  Chronicity:  Recurrent  Relieved by:  Nothing  Worsened by:  Nothing  Ineffective treatments:  None tried  Associated symptoms: swelling    Associated symptoms: no decreased ROM, no fever, no muscle weakness, no neck pain, no numbness, no stiffness and no tingling        Prior to Admission Medications   Prescriptions Last Dose Informant Patient Reported? Taking?   cyclobenzaprine (FLEXERIL) 10 mg tablet   No No   Sig: Take 1 tablet (10 mg total) by mouth 3 (three) times a day as needed for muscle spasms   escitalopram (LEXAPRO) 10 mg tablet   Yes No   Sig: Take 10 mg by mouth daily   ibuprofen (MOTRIN) 200 mg tablet   No No   Sig: Take 3 tablets (600 mg total) by mouth every 8 (eight) hours as needed for moderate pain or severe pain   lithium 300 MG tablet   Yes No   Sig: Take 300 mg by mouth 2 (two) times a day      Facility-Administered Medications: None       Past Medical History:   Diagnosis Date    Depression     Intracranial subdural hematoma (HCC) 3/25/2013       Past Surgical History:   Procedure Laterality Date    CRANIECTOMY       NOSE SURGERY         History reviewed. No pertinent family history.  I have reviewed and agree with the history as documented.    E-Cigarette/Vaping    E-Cigarette Use Never User      E-Cigarette/Vaping Substances     Social History     Tobacco Use    Smoking status: Some Days     Types: Cigars    Smokeless tobacco: Never   Vaping Use    Vaping status: Never Used   Substance Use Topics    Alcohol use: Yes    Drug use: Not Currently       Review of Systems   Constitutional:  Negative for chills and fever.   HENT:  Negative for ear pain, hearing loss, sore throat, trouble swallowing and voice change.    Eyes:  Negative for pain and discharge.   Respiratory:  Negative for cough, shortness of breath and wheezing.    Cardiovascular:  Negative for chest pain and palpitations.   Gastrointestinal:  Negative for abdominal pain, blood in stool, constipation, diarrhea, nausea and vomiting.   Genitourinary:  Negative for dysuria, flank pain, frequency and hematuria.   Musculoskeletal:  Negative for joint swelling, neck pain, neck stiffness and stiffness.   Skin:  Negative for rash and wound.   Neurological:  Negative for dizziness, seizures, syncope, facial asymmetry and headaches.   Psychiatric/Behavioral:  Negative for hallucinations, self-injury and suicidal ideas.    All other systems reviewed and are negative.      Physical Exam  Physical Exam  Vitals and nursing note reviewed.   Constitutional:       General: He is not in acute distress.     Appearance: Normal appearance. He is well-developed. He is not ill-appearing or diaphoretic.   HENT:      Head: Normocephalic and atraumatic.      Right Ear: External ear normal.      Left Ear: External ear normal.   Eyes:      General: No scleral icterus.        Right eye: No discharge.         Left eye: No discharge.      Extraocular Movements: Extraocular movements intact.      Conjunctiva/sclera: Conjunctivae normal.   Pulmonary:      Effort: Pulmonary effort is normal. No  respiratory distress.   Musculoskeletal:         General: No swelling or deformity. Normal range of motion.      Cervical back: Normal range of motion and neck supple.      Comments: Some redness and swelling in the prepatellar area of the left knee.  Patient has normal flexion and extension and there is no significant bony tenderness.  There is no proximal or distal streaking.  The exam is consistent with prepatellar bursitis   Skin:     General: Skin is dry.      Coloration: Skin is not jaundiced or pale.      Findings: No rash.   Neurological:      General: No focal deficit present.      Mental Status: He is alert and oriented to person, place, and time.      Cranial Nerves: No cranial nerve deficit.      Motor: No weakness.      Coordination: Coordination normal.      Gait: Gait normal.   Psychiatric:         Mood and Affect: Mood normal.         Behavior: Behavior normal.         Thought Content: Thought content normal.         Judgment: Judgment normal.         Vital Signs  ED Triage Vitals [06/09/24 2103]   Temperature Pulse Respirations Blood Pressure SpO2   97.8 °F (36.6 °C) 69 18 (!) 171/103 98 %      Temp Source Heart Rate Source Patient Position - Orthostatic VS BP Location FiO2 (%)   Temporal Monitor Sitting Left arm --      Pain Score       4           Vitals:    06/09/24 2103   BP: (!) 171/103   Pulse: 69   Patient Position - Orthostatic VS: Sitting         Visual Acuity      ED Medications  Medications   ketorolac (TORADOL) injection 30 mg (has no administration in time range)       Diagnostic Studies  Results Reviewed       None                   No orders to display              Procedures  Procedures         ED Course                                             Medical Decision Making  Based on the history and medical screening exam performed the diagnostic considerations include but are not limited to bursitis, arthritis.    Based on the work-up performed in the emergency room which includes  physical examination, and which may include laboratory studies and imaging as warranted including advanced imaging such as CT scan or ultrasound, the diagnostic considerations are narrowed to exclude limb or life-threatening process.    The patient is stable for discharge.    Symptoms and examination consistent with prepatellar bursitis.  Patient advised to follow-up with orthopedics and outpatient follow-up information provided.  Patient already taking naproxen, using a brace and icing the knee.  Advised to continue these treatments.  Patient did receive Toradol IM in the emergency department             Disposition  Final diagnoses:   Prepatellar bursitis of left knee     Time reflects when diagnosis was documented in both MDM as applicable and the Disposition within this note       Time User Action Codes Description Comment    6/9/2024  9:13 PM Jerman Colon Add [M70.42] Prepatellar bursitis of left knee           ED Disposition       ED Disposition   Discharge    Condition   Stable    Date/Time   Sun Jun 9, 2024  9:13 PM    Comment   Tom Boyer discharge to home/self care.                   Follow-up Information       Follow up With Specialties Details Why Contact Info    Priya De Jesus MD Family Medicine   [address] [facility] [address] [phone]      [facility]   [address] [facility]  [address]  [facility] [address]  [phone]              Patient's Medications   Discharge Prescriptions    No medications on file       No discharge procedures on file.    PDMP Review       None            ED Provider  Electronically Signed by             Jerman Colon MD  06/09/24 1545

## 2025-04-19 ENCOUNTER — HOSPITAL ENCOUNTER (EMERGENCY)
Facility: HOSPITAL | Age: 35
Discharge: HOME | End: 2025-04-19
Payer: COMMERCIAL

## 2025-04-19 VITALS
RESPIRATION RATE: 16 BRPM | BODY MASS INDEX: 42.07 KG/M2 | DIASTOLIC BLOOD PRESSURE: 96 MMHG | WEIGHT: 276.68 LBS | OXYGEN SATURATION: 100 % | HEART RATE: 76 BPM | SYSTOLIC BLOOD PRESSURE: 177 MMHG

## 2025-04-19 DIAGNOSIS — L03.90 CELLULITIS: Primary | ICD-10-CM

## 2025-04-19 PROCEDURE — 99282 EMERGENCY DEPT VISIT SF MDM: CPT

## 2025-04-19 PROCEDURE — 99284 EMERGENCY DEPT VISIT MOD MDM: CPT | Performed by: PHYSICIAN ASSISTANT

## 2025-04-19 RX ORDER — CEPHALEXIN 500 MG/1
500 CAPSULE ORAL EVERY 6 HOURS SCHEDULED
Qty: 28 CAPSULE | Refills: 0 | Status: SHIPPED | OUTPATIENT
Start: 2025-04-19 | End: 2025-04-26

## 2025-04-19 RX ADMIN — CEPHALEXIN 500 MG: 250 CAPSULE ORAL at 18:45

## 2025-04-19 NOTE — ED PROVIDER NOTES
Time reflects when diagnosis was documented in both MDM as applicable and the Disposition within this note       Time User Action Codes Description Comment    4/19/2025  6:42 PM HenriettaTrudy Add [L03.90] Cellulitis           ED Disposition       ED Disposition   Discharge    Condition   Stable    Date/Time   Sat Apr 19, 2025  6:42 PM    Comment   Tom Boyer discharge to home/self care.                   Assessment & Plan       Medical Decision Making  34-year-old male presents to the emergency department for evaluation of ankle redness swelling and discomfort.  Vitals and medical record reviewed.  Patient received the following but not limited to cellulitis, wound infection, sprain, strain, fracture, dislocation (low concern without traumatic injury) DVT (no history of DVT, no calf swelling, wells criteria low).  Based off history and physical exam was consistent with cellulitis.  Patient was started on oral antibiotics.  We discussed symptomatic treatment at home return precautions and follow-up and patient verbalized understanding.  He is clinically hemodynamically stable for discharge.    Risk  Prescription drug management.             Medications   cephalexin (KEFLEX) capsule 500 mg (500 mg Oral Given 4/19/25 1845)       ED Risk Strat Scores                    No data recorded        SBIRT 20yo+      Flowsheet Row Most Recent Value   Initial Alcohol Screen: US AUDIT-C     1. How often do you have a drink containing alcohol? 0 Filed at: 04/19/2025 1830   2. How many drinks containing alcohol do you have on a typical day you are drinking?  0 Filed at: 04/19/2025 1830   3a. Male UNDER 65: How often do you have five or more drinks on one occasion? 0 Filed at: 04/19/2025 1830   Audit-C Score 0 Filed at: 04/19/2025 1830   JOSE CARLOS: How many times in the past year have you...    Used an illegal drug or used a prescription medication for non-medical reasons? Never Filed at: 04/19/2025 1830              Jill  Criteria for DVT      Flowsheet Row Most Recent Value   Patrice' Criteria for DVT    Active cancer Treatment or palliation within 6 months 0 Filed at: 04/19/2025 1858   Bedridden recently >3 days or major surgery within 12 weeks 0 Filed at: 04/19/2025 1858   Calf swelling >3 cm compared to the other leg 0 Filed at: 04/19/2025 1858   Entire leg swollen 0 Filed at: 04/19/2025 1858   Collateral (nonvaricose) superficial veins present 0 Filed at: 04/19/2025 1858   Localized tenderness along the deep venous system 0 Filed at: 04/19/2025 1858   Pitting edema, confined to symptomatic leg 0 Filed at: 04/19/2025 1858   Paralysis, paresis, or recent plaster immobilization of the lower extremity 0 Filed at: 04/19/2025 1858   Previously documented DVT 0 Filed at: 04/19/2025 1858   Alternative diagnosis to DVT as likely or more likely 2 Filed at: 04/19/2025 1858   Patrice DVT Critera Score -2 Filed at: 04/19/2025 1858                      History of Present Illness       Chief Complaint   Patient presents with    Ankle Pain     Pt states left ankle was itchy and he scratched it. Noticed swelling and pain in the area starting yesterday       Past Medical History:   Diagnosis Date    Depression     Intracranial subdural hematoma (HCC) 3/25/2013      Past Surgical History:   Procedure Laterality Date    CRANIECTOMY      NOSE SURGERY        History reviewed. No pertinent family history.   Social History     Tobacco Use    Smoking status: Some Days     Types: Cigars    Smokeless tobacco: Never   Vaping Use    Vaping status: Never Used   Substance Use Topics    Alcohol use: Yes    Drug use: Not Currently      E-Cigarette/Vaping    E-Cigarette Use Never User       E-Cigarette/Vaping Substances      I have reviewed and agree with the history as documented.     34-year-old male presents to the emergency department for evaluation of left ankle redness and swelling.  Patient states he wears tall boots for work with short socks.  States this  "often resulted in \"hotspots\" on his ankles that are very itchy.  States he frequently itches these open.  States over the last several days he noticed that the left ankle has become red and swollen near an area he has a history open.  He denies history of cellulitis.  He is nondiabetic.  Denies any fevers or chills.  Denies any red streaking up the leg.  Patient denies any trauma or injury to the ankle        Review of Systems   Constitutional: Negative.    Respiratory: Negative.     Cardiovascular: Negative.    Musculoskeletal:  Positive for arthralgias.   Skin:  Positive for color change and wound.   Neurological: Negative.    All other systems reviewed and are negative.          Objective       ED Triage Vitals [04/19/25 1828]   Temp Pulse Blood Pressure Respirations SpO2 Patient Position - Orthostatic VS   -- 76 (!) 177/96 16 100 % --      Temp src Heart Rate Source BP Location FiO2 (%) Pain Score    -- -- -- -- 2      Vitals      Date and Time Temp Pulse SpO2 Resp BP Pain Score FACES Pain Rating User   04/19/25 1828 -- 76 100 % 16 177/96 2 -- MD            Physical Exam  Vitals and nursing note reviewed.   Constitutional:       Appearance: Normal appearance.   HENT:      Head: Normocephalic.   Eyes:      Conjunctiva/sclera: Conjunctivae normal.   Cardiovascular:      Rate and Rhythm: Normal rate and regular rhythm.   Pulmonary:      Effort: Pulmonary effort is normal.      Breath sounds: Normal breath sounds. No stridor. No wheezing, rhonchi or rales.   Chest:      Chest wall: No tenderness.   Musculoskeletal:         General: Swelling and tenderness present. Normal range of motion.   Skin:     General: Skin is warm and dry.      Findings: Erythema present.      Comments: Superficial wounds on medial and lateral aspect of the left ankle rounding on redness on both sides with mild swelling.  The area is warm to the touch no significant tenderness.  No drainage.  No redness streaking up the leg   Neurological:    "   General: No focal deficit present.      Mental Status: He is alert.         Results Reviewed       None            No orders to display       Procedures    ED Medication and Procedure Management   Prior to Admission Medications   Prescriptions Last Dose Informant Patient Reported? Taking?   cyclobenzaprine (FLEXERIL) 10 mg tablet   No No   Sig: Take 1 tablet (10 mg total) by mouth 3 (three) times a day as needed for muscle spasms   escitalopram (LEXAPRO) 10 mg tablet   Yes No   Sig: Take 10 mg by mouth daily   ibuprofen (MOTRIN) 200 mg tablet   No No   Sig: Take 3 tablets (600 mg total) by mouth every 8 (eight) hours as needed for moderate pain or severe pain   lithium 300 MG tablet   Yes No   Sig: Take 300 mg by mouth 2 (two) times a day      Facility-Administered Medications: None     Discharge Medication List as of 4/19/2025  6:43 PM        START taking these medications    Details   cephalexin (KEFLEX) 500 mg capsule Take 1 capsule (500 mg total) by mouth every 6 (six) hours for 7 days, Starting Sat 4/19/2025, Until Sat 4/26/2025, Normal           CONTINUE these medications which have NOT CHANGED    Details   cyclobenzaprine (FLEXERIL) 10 mg tablet Take 1 tablet (10 mg total) by mouth 3 (three) times a day as needed for muscle spasms, Starting Mon 8/24/2020, Print      escitalopram (LEXAPRO) 10 mg tablet Take 10 mg by mouth daily, Historical Med      ibuprofen (MOTRIN) 200 mg tablet Take 3 tablets (600 mg total) by mouth every 8 (eight) hours as needed for moderate pain or severe pain, Starting Mon 8/24/2020, No Print      lithium 300 MG tablet Take 300 mg by mouth 2 (two) times a day, Historical Med           No discharge procedures on file.  ED SEPSIS DOCUMENTATION   Time reflects when diagnosis was documented in both MDM as applicable and the Disposition within this note       Time User Action Codes Description Comment    4/19/2025  6:42 PM Trudy Shrestha Add [L03.90] Cellulitis                  Trudy  TOSIN Shrestha  04/19/25 4650

## 2025-04-19 NOTE — DISCHARGE INSTRUCTIONS
Take antibiotics as prescribed.  I recommend wearing long socks while wearing work boots.  You can additionally put a bandage over affected area to avoid friction/rubbing.  Please follow-up with your family doctor return to the emergency department any new or worsening symptoms.